# Patient Record
Sex: MALE
[De-identification: names, ages, dates, MRNs, and addresses within clinical notes are randomized per-mention and may not be internally consistent; named-entity substitution may affect disease eponyms.]

---

## 2019-01-10 ENCOUNTER — HOSPITAL ENCOUNTER (OUTPATIENT)
Dept: HOSPITAL 42 - CARDIO | Age: 49
End: 2019-01-10
Payer: COMMERCIAL

## 2019-01-17 ENCOUNTER — HOSPITAL ENCOUNTER (OUTPATIENT)
Dept: HOSPITAL 42 - CATH | Age: 49
LOS: 1 days | Discharge: HOME | End: 2019-01-18
Attending: INTERNAL MEDICINE
Payer: COMMERCIAL

## 2019-01-17 VITALS — BODY MASS INDEX: 30.4 KG/M2

## 2019-01-17 DIAGNOSIS — Z95.1: ICD-10-CM

## 2019-01-17 DIAGNOSIS — E11.9: ICD-10-CM

## 2019-01-17 DIAGNOSIS — I25.2: ICD-10-CM

## 2019-01-17 DIAGNOSIS — I25.10: Primary | ICD-10-CM

## 2019-01-17 DIAGNOSIS — Z95.5: ICD-10-CM

## 2019-01-17 DIAGNOSIS — E78.00: ICD-10-CM

## 2019-01-17 DIAGNOSIS — I10: ICD-10-CM

## 2019-01-17 DIAGNOSIS — E66.3: ICD-10-CM

## 2019-01-17 LAB
APTT BLD: 32.2 SECONDS (ref 25.1–36.5)
BASOPHILS # BLD AUTO: 0.02 K/MM3 (ref 0–2)
BASOPHILS NFR BLD: 0.2 % (ref 0–3)
BUN SERPL-MCNC: 17 MG/DL (ref 7–21)
CALCIUM SERPL-MCNC: 9.9 MG/DL (ref 8.4–10.5)
EOSINOPHIL # BLD: 0.3 10*3/UL (ref 0–0.7)
EOSINOPHIL NFR BLD: 2.5 % (ref 1.5–5)
ERYTHROCYTE [DISTWIDTH] IN BLOOD BY AUTOMATED COUNT: 12.2 % (ref 11.5–14.5)
GFR NON-AFRICAN AMERICAN: > 60
GRANULOCYTES # BLD: 4.48 10*3/UL (ref 1.4–6.5)
GRANULOCYTES NFR BLD: 44.3 % (ref 50–68)
HDLC SERPL-MCNC: 31 MG/DL (ref 29–60)
HGB BLD-MCNC: 16.5 G/DL (ref 14–18)
INR PPP: 0.92
LDLC SERPL-MCNC: 92 MG/DL (ref 0–129)
LYMPHOCYTES # BLD: 4.9 10*3/UL (ref 1.2–3.4)
LYMPHOCYTES NFR BLD AUTO: 48.5 % (ref 22–35)
MCH RBC QN AUTO: 31.5 PG (ref 25–35)
MCHC RBC AUTO-ENTMCNC: 34.1 G/DL (ref 31–37)
MCV RBC AUTO: 92.5 FL (ref 80–105)
MONOCYTES # BLD AUTO: 0.5 10*3/UL (ref 0.1–0.6)
MONOCYTES NFR BLD: 4.5 % (ref 1–6)
PLATELET # BLD: 266 10^3/UL (ref 120–450)
PMV BLD AUTO: 9.1 FL (ref 7–11)
PROTHROMBIN TIME: 10.6 SECONDS (ref 9.4–12.5)
RBC # BLD AUTO: 5.23 10^6/UL (ref 3.5–6.1)
WBC # BLD AUTO: 10.1 10^3/UL (ref 4.5–11)

## 2019-01-17 PROCEDURE — 85025 COMPLETE CBC W/AUTO DIFF WBC: CPT

## 2019-01-17 PROCEDURE — 80048 BASIC METABOLIC PNL TOTAL CA: CPT

## 2019-01-17 PROCEDURE — 80061 LIPID PANEL: CPT

## 2019-01-17 PROCEDURE — 86900 BLOOD TYPING SEROLOGIC ABO: CPT

## 2019-01-17 PROCEDURE — 86850 RBC ANTIBODY SCREEN: CPT

## 2019-01-17 PROCEDURE — 36415 COLL VENOUS BLD VENIPUNCTURE: CPT

## 2019-01-17 PROCEDURE — 93459 L HRT ART/GRFT ANGIO: CPT

## 2019-01-17 PROCEDURE — 85730 THROMBOPLASTIN TIME PARTIAL: CPT

## 2019-01-17 PROCEDURE — 93005 ELECTROCARDIOGRAM TRACING: CPT

## 2019-01-17 PROCEDURE — 85610 PROTHROMBIN TIME: CPT

## 2019-01-17 PROCEDURE — 99152 MOD SED SAME PHYS/QHP 5/>YRS: CPT

## 2019-01-17 PROCEDURE — 99153 MOD SED SAME PHYS/QHP EA: CPT

## 2019-01-17 NOTE — CARDCATH
PROCEDURE DATE:  01/17/2019



HISTORY:  The patient is a 48-year-old male with history of coronary artery

bypass surgery, multiple stents as well as an inferior wall MI in the past

who presents with an abnormal stress test with a decreasing LV function,

because of this, cardiac catheterization was recommended.



PROCEDURE:  Left heart catheterization with coronary arteriography, left

ventriculogram, LIMA angiogram, supra-aortic valvular injection as well as

followed by PTCA and stent of the proximal LAD.



The right femoral artery was cannulated with 6-Swedish sheath.  There were

no complications.



I performed moderate sedation which included the presence of an independent

trained observer that assisted in monitoring the patient's level of

consciousness and physiologic status.  After administration of Versed and

fentanyl, my intra service time was 45 minutes.



The findings on catheterization revealed a left ventricle that revealed a

normal arlen anterior apical segment.  The inferobasal segment was

akinetic consistent with an inferior wall MI.  Overall ejection fraction is

approximately 40%.



Supra-aortic valvular injection revealed no aortic insufficiency.



The patient had a right dominant circulation.  The RCA revealed a patent

stent in the proximal midportion.  The stent was a long stent.  In the

midportion of the stent, there is a 40%-50% stenoses noted.



The left main artery had tubular narrowing throughout with discrete lesion.



The LAD revealed long critical lesion in the proximal portion of 80% with a

second 70%-80% stenoses at the takeoff of a large diagonal vessel.



The diagonal vessel revealed diffuse atherosclerosis with a sub-occluded

superior branch.



The LAD revealed to-and-fro flow from the LIMA.



The circumflex artery revealed diffuse atherosclerosis with a subtotal

occluded small first obtuse marginal branch.



The LIMA was found to be patent _____ antegrade flow to the distal LAD with

retrograde flow to the second diagonal vessel without retrograde flow to

the first diagonal vessel.



The patient was started on intravenous Angiomax on the fluoroscopic guide,

the guiding catheter was placed in the _____ in the left main artery.  An

0.014 ATW wire was used to cross the critical lesion into the diagonal

vessel.  A 2.5 balloon was utilized to predilate the lesion.



A 2.75 x 20 mm drug-eluting stent was placed and deployed extending from

the proximal LAD into the takeoff of the diagonal vessel.



Repeat coronary arteriography revealed an excellent result with no residual

stenosis and SYLVAIN-III flow with preservation of flow to the LAD at the

diagonal vessel.



Angio-Seal was used to close the femoral artery site.  The patient

tolerated the procedure well.



In summary, the procedure was successful, a PTCA and stent of the proximal

LAD and second lesion at the takeoff of a large diagonal vessel.  The first

diagonal vessel which is a large vessel was not protected by the LIMA which

is the rationale placing a stent in the proximal LAD.



Preservation of the flow down the mid and distal LAD was observed with

to-and-fro flow from the LIMA.



Coronary arteriography revealed triple-vessel CAD with a patent stent in

the proximal RCA with 50% stenosis in the mid stent.



The circumflex artery revealed and occluded small first obtuse marginal

branch.



The diagonal vessel was now patent; however, had a superior branch of the

diagonal vessel that was subtotally occluded.



LV function was better than reported on the stress test.  Overall ejection

fraction is approximately 40% with a akinetic inferobasal segment

consistent with his previous inferior wall MI.



Given these findings, the patient will need to be on aspirin indefinitely

and Brilinta for now the year.



He needs to have an aggressive cardiac risk reduction program given his

aggressive atherosclerosis.  We will consider and push for _____ the

patient as well as his insurance can improve his payment.







__________________________________________

Leland Lopez MD





DD:  01/17/2019 10:18:30

DT:  01/17/2019 10:23:46

Job # [id number]

## 2019-01-17 NOTE — CARD
--------------- APPROVED REPORT --------------





Date of service: 01/17/2019



EKG Measurement

Heart [id number]

TN 150P10

[id number]

[id number]

[id number]



<Conclusion>

Normal sinus rhythm

Left axis deviation

Moderate voltage criteria for LVH, may be normal variant

Possible Lateral infarct, age undetermined

Inferior infarct, age undetermined

STTW changes c/w ischemia

## 2019-01-18 VITALS
OXYGEN SATURATION: 100 % | SYSTOLIC BLOOD PRESSURE: 132 MMHG | DIASTOLIC BLOOD PRESSURE: 79 MMHG | HEART RATE: 65 BPM | RESPIRATION RATE: 18 BRPM

## 2019-01-18 VITALS — TEMPERATURE: 98.2 F

## 2019-01-18 LAB
BASOPHILS # BLD AUTO: 0.01 K/MM3 (ref 0–2)
BASOPHILS NFR BLD: 0.1 % (ref 0–3)
BUN SERPL-MCNC: 14 MG/DL (ref 7–21)
CALCIUM SERPL-MCNC: 9.6 MG/DL (ref 8.4–10.5)
EOSINOPHIL # BLD: 0.2 10*3/UL (ref 0–0.7)
EOSINOPHIL NFR BLD: 2 % (ref 1.5–5)
ERYTHROCYTE [DISTWIDTH] IN BLOOD BY AUTOMATED COUNT: 12.3 % (ref 11.5–14.5)
GFR NON-AFRICAN AMERICAN: > 60
GRANULOCYTES # BLD: 4.79 10*3/UL (ref 1.4–6.5)
GRANULOCYTES NFR BLD: 54.4 % (ref 50–68)
HGB BLD-MCNC: 15.5 G/DL (ref 14–18)
LYMPHOCYTES # BLD: 3.3 10*3/UL (ref 1.2–3.4)
LYMPHOCYTES NFR BLD AUTO: 37.5 % (ref 22–35)
MCH RBC QN AUTO: 31.4 PG (ref 25–35)
MCHC RBC AUTO-ENTMCNC: 34.1 G/DL (ref 31–37)
MCV RBC AUTO: 91.9 FL (ref 80–105)
MONOCYTES # BLD AUTO: 0.5 10*3/UL (ref 0.1–0.6)
MONOCYTES NFR BLD: 6 % (ref 1–6)
PLATELET # BLD: 225 10^3/UL (ref 120–450)
PMV BLD AUTO: 9.1 FL (ref 7–11)
RBC # BLD AUTO: 4.94 10^6/UL (ref 3.5–6.1)
WBC # BLD AUTO: 8.8 10^3/UL (ref 4.5–11)

## 2019-01-18 NOTE — CARD
--------------- APPROVED REPORT --------------





Date of service: 01/18/2019



EKG Measurement

Heart [id number]

AR 150P18

[id number]

[id number]

[id number]



<Conclusion>

Sinus rhythm with occasional and consecutive premature ventricular 

complexes

Minimal voltage criteria for LVH, may be normal variant

Inferior infarct, age undetermined

Abnormal ECG

## 2019-01-18 NOTE — CARD
--------------- APPROVED REPORT --------------





Date of service: 01/17/2019



EKG Measurement

Heart [id number]

AR 156P12

[id number]

[id number]

[id number]



<Conclusion>

Normal sinus rhythm

Left axis deviation

Minimal voltage criteria for LVH, may be normal variant

Inferior infarct, age undetermined

Abnormal ECG

## 2019-04-08 ENCOUNTER — HOSPITAL ENCOUNTER (OUTPATIENT)
Dept: HOSPITAL 42 - CATH | Age: 49
LOS: 1 days | Discharge: HOME | End: 2019-04-09
Attending: INTERNAL MEDICINE
Payer: COMMERCIAL

## 2019-04-08 VITALS — RESPIRATION RATE: 18 BRPM

## 2019-04-08 VITALS — BODY MASS INDEX: 32.5 KG/M2

## 2019-04-08 DIAGNOSIS — Z82.49: ICD-10-CM

## 2019-04-08 DIAGNOSIS — I10: ICD-10-CM

## 2019-04-08 DIAGNOSIS — E78.00: ICD-10-CM

## 2019-04-08 DIAGNOSIS — I42.9: ICD-10-CM

## 2019-04-08 DIAGNOSIS — I25.10: Primary | ICD-10-CM

## 2019-04-08 DIAGNOSIS — Z95.1: ICD-10-CM

## 2019-04-08 DIAGNOSIS — I25.2: ICD-10-CM

## 2019-04-08 DIAGNOSIS — Z95.5: ICD-10-CM

## 2019-04-08 LAB
APTT BLD: 32.3 SECONDS (ref 26.9–38.3)
BASOPHILS # BLD AUTO: 0.03 K/MM3 (ref 0–2)
BASOPHILS NFR BLD: 0.3 % (ref 0–3)
BUN SERPL-MCNC: 21 MG/DL (ref 7–21)
CALCIUM SERPL-MCNC: 9.2 MG/DL (ref 8.4–10.5)
EOSINOPHIL # BLD: 0.3 10*3/UL (ref 0–0.7)
EOSINOPHIL NFR BLD: 3.2 % (ref 1.5–5)
ERYTHROCYTE [DISTWIDTH] IN BLOOD BY AUTOMATED COUNT: 12.3 % (ref 11.5–14.5)
GFR NON-AFRICAN AMERICAN: > 60
HDLC SERPL-MCNC: 27 MG/DL (ref 29–60)
HGB BLD-MCNC: 16.1 G/DL (ref 14–18)
INR PPP: 1.05
LDLC SERPL-MCNC: 66 MG/DL (ref 0–129)
LYMPHOCYTES # BLD: 3.5 10*3/UL (ref 1.2–3.4)
LYMPHOCYTES NFR BLD AUTO: 36.4 % (ref 22–35)
MCH RBC QN AUTO: 31.3 PG (ref 25–35)
MCHC RBC AUTO-ENTMCNC: 34.3 G/DL (ref 31–37)
MCV RBC AUTO: 91.3 FL (ref 80–105)
MONOCYTES # BLD AUTO: 0.7 10*3/UL (ref 0.1–0.6)
MONOCYTES NFR BLD: 6.7 % (ref 1–6)
PLATELET # BLD: 205 10^3/UL (ref 120–450)
PMV BLD AUTO: 9.2 FL (ref 7–11)
PROTHROMBIN TIME: 11.9 SECONDS (ref 9.4–12.5)
RBC # BLD AUTO: 5.15 10^6/UL (ref 3.5–6.1)
WBC # BLD AUTO: 9.7 10^3/UL (ref 4.5–11)

## 2019-04-08 PROCEDURE — 80053 COMPREHEN METABOLIC PANEL: CPT

## 2019-04-08 PROCEDURE — 85610 PROTHROMBIN TIME: CPT

## 2019-04-08 PROCEDURE — 36415 COLL VENOUS BLD VENIPUNCTURE: CPT

## 2019-04-08 PROCEDURE — 85025 COMPLETE CBC W/AUTO DIFF WBC: CPT

## 2019-04-08 PROCEDURE — 92920 PRQ TRLUML C ANGIOP 1ART&/BR: CPT

## 2019-04-08 PROCEDURE — 80048 BASIC METABOLIC PNL TOTAL CA: CPT

## 2019-04-08 PROCEDURE — 99152 MOD SED SAME PHYS/QHP 5/>YRS: CPT

## 2019-04-08 PROCEDURE — 82139 AMINO ACIDS QUAN 6 OR MORE: CPT

## 2019-04-08 PROCEDURE — 85730 THROMBOPLASTIN TIME PARTIAL: CPT

## 2019-04-08 PROCEDURE — 85576 BLOOD PLATELET AGGREGATION: CPT

## 2019-04-08 PROCEDURE — 85175 BLOOD CLOT LYSIS TIME: CPT

## 2019-04-08 PROCEDURE — 99153 MOD SED SAME PHYS/QHP EA: CPT

## 2019-04-08 PROCEDURE — 93459 L HRT ART/GRFT ANGIO: CPT

## 2019-04-08 PROCEDURE — 86900 BLOOD TYPING SEROLOGIC ABO: CPT

## 2019-04-08 PROCEDURE — 93005 ELECTROCARDIOGRAM TRACING: CPT

## 2019-04-08 PROCEDURE — 80061 LIPID PANEL: CPT

## 2019-04-08 PROCEDURE — 85027 COMPLETE CBC AUTOMATED: CPT

## 2019-04-08 PROCEDURE — 86850 RBC ANTIBODY SCREEN: CPT

## 2019-04-08 NOTE — HP
DATE OF EXAM:  04/08/2019



HISTORY OF PRESENT ILLNESS:  I saw him status post cardiac cath with Dr. Lopez, where he put a stent and he comes in with a history of having chest

pressure with exertion and pain and swelling in his feet with prolonged

standing.  He is a 49-year-old man who has a past medical history of

myocardial infarction, pneumonia, coronary artery disease, high

cholesterol, cardiac stents in the past, MI in the past, cardiomyopathy,

CABG history, LASIK.  I think this is up to his fourth stent, right now he

has a AICD placement.  There is cardiovascular disease in his family with

his father.  He has  defibrillator AICD.  He has had chest pain and

swelling.  He is lying flat right now on the rBaker.



ALLERGIES:  HE HAS NO KNOWN DRUG ALLERGIES.



MEDICATIONS:  He is currently on Crestor, Ecotrin, Lasix and Zestril at

home.  In the hospital, he is on Ecotrin, Lipitor, Plavix, IV fluids and

Zestril.



REVIEW OF SYSTEMS:  He has no acute vision or hearing changes.  No neck

pain.  No sore throat.  He did have chest pain.  He has shortness of

breath.  No nausea, vomiting, constipation, diarrhea.  His legs were

swollen.  No problems urinating.  No skin issues that he knows of.



PHYSICAL EXAMINATION:

VITAL SIGNS:  He has a 97.8 temperature, 81 pulse, 149/86 blood pressure,

18 respiratory rate, 96% O2 sat on room air.

HEENT:  Head is atraumatic, normocephalic.

HEART:  Regular rate.

LUNGS:  Decreased breath sounds, but clear what I could tell.

ABDOMEN:  Soft, obese, nontender with positive bowel sounds.

EXTREMITIES:  Trace edema, +1.

SKIN:  I could not tell the skin is lying flat on the gurney, he is 

to get up for 6 hours.

NEUROLOGIC:  Alert and oriented x3.  Thyroid midline.  No palpable

appreciable lymphadenopathy at this time.



LABORATORY DATA:  He has a 9.7 white count, 16.1 hemoglobin, 47 hematocrit

with 205 platelets.  A 1.05 of INR.  He has a 141 sodium, potassium 4.9,

BUN 21, creatinine 1.1, GRF is greater than 60, sugar is 118.  Calcium is

9.2, triglycerides 104, cholesterol is 109.  He will be watched overnight,

laid flat for 6 hours.  We will check his labs tomorrow.  He is here

for CAD from chest pain and shortness of breath with stent placement.







__________________________________________

Eligio Segura DO





DD:  04/08/2019 9:17:08

DT:  04/08/2019 9:18:47

Job # [id number]

[id number]

## 2019-04-08 NOTE — CARDCATH
PROCEDURE DATE:  04/08/2019



HISTORY:  The patient is a 49-year-old male who presents with recurrence of

exertional angina.



The patient's past medical history is complicated with history of coronary

bypass surgery and multiple PTCAs in the past done at [facility].  Because of his ongoing symptoms, cardiac catheterization was

recommended.



PROCEDURES:  Left heart catheterization with coronary arteriography, left

ventriculogram, aortic root injection, left internal mammary artery

angiogram, as well as percutaneous transluminal coronary angioplasty of a

small distal left anterior descending through the left internal mammary

artery anastomotic area.



The left femoral artery was cannulated with 6-Romansh sheath.  There were no

complications.



I performed moderate sedation which included the presence of an independent

trained observer that assisted in monitoring the patient's level of

consciousness and physiologic status.  After administration of Versed and

fentanyl, my intra-service time was 45 minutes.



Findings on catheterization revealed a left ventricle that revealed

inferior wall hypokinesis with an anterior and apical segment that moves

well.  Overall ejection fraction is approximately 40%.



Supra-aortic valvular injection revealed no aortic insufficiency.



The RCA was a dominant vessel and revealed diffuse atherosclerosis

throughout its tree including the 50% to 60% stenoses in the posterior

lateral branch.



The left main artery revealed a 50% to 60% stenosis in its proximal

portion.  The LAD revealed a patent stent in its proximal portion as well

as to-and-fro flow in the distal LAD after an 80% stenoses.



There was good flow down the diagonal vessel which was diffusely diseased.



Circumflex artery and obtuse marginal branch revealed diffuse

atherosclerosis.



The LIMA was found to be patent and provided good antegrade flow.  Distal

to the anastomotic site, there is a 90% stenoses in a small apical LAD.



Because of his ongoing symptoms, the patient was started on intravenous

Angiomax.  Under fluoroscopic guide, a LIMA catheter was placed in the

ostium of the LIMA site.



An ATW wire was used to cross the LAD past the anastomotic site.  A 2.0

balloon was utilized to dilate the area up to 5 to 6 atmospheres.  Repeat

coronary arteriography revealed improvement of the lesion up to a 40% to

50% residual stenoses with SYLVAIN III flow.  No stent was placed due to the

size of the vessel.



The patient tolerated the procedure well.  AngioSeal was used to close the

femoral artery site.



In summary, the procedure was successful PTCA of the apical LAD lesions

through the LIMA.  No stent was placed due to the small size of the apical

LAD.



Cardiac catheterization revealed an EF of approximately 40% with an

akinetic inferior wall.



Borderline left main stenoses with a critical lesion in the apical LAD

after the anastomotic site.  A patent LIMA was noted.



Given these findings, the patient will continue on aspirin indefinitely and

Plavix for a month.  The patient needs to undergo a cardiac risk reduction

program.







__________________________________________

Leland Lopez MD





DD:  04/08/2019 9:23:42

DT:  04/08/2019 9:27:17

Job # [id number]

## 2019-04-09 VITALS — OXYGEN SATURATION: 97 % | TEMPERATURE: 97 F

## 2019-04-09 VITALS — HEART RATE: 92 BPM

## 2019-04-09 VITALS — DIASTOLIC BLOOD PRESSURE: 72 MMHG | SYSTOLIC BLOOD PRESSURE: 141 MMHG

## 2019-04-09 LAB
ALBUMIN SERPL-MCNC: 4.1 G/DL (ref 3–4.8)
ALBUMIN/GLOB SERPL: 1.4 {RATIO} (ref 1.1–1.8)
ALT SERPL-CCNC: 43 U/L (ref 7–56)
AST SERPL-CCNC: 26 U/L (ref 17–59)
BASOPHILS # BLD AUTO: 0.02 K/MM3 (ref 0–2)
BASOPHILS NFR BLD: 0.3 % (ref 0–3)
BUN SERPL-MCNC: 14 MG/DL (ref 7–21)
CALCIUM SERPL-MCNC: 9.7 MG/DL (ref 8.4–10.5)
EOSINOPHIL # BLD: 0.3 10*3/UL (ref 0–0.7)
EOSINOPHIL NFR BLD: 3.8 % (ref 1.5–5)
ERYTHROCYTE [DISTWIDTH] IN BLOOD BY AUTOMATED COUNT: 12.6 % (ref 11.5–14.5)
GFR NON-AFRICAN AMERICAN: > 60
HGB BLD-MCNC: 15.2 G/DL (ref 14–18)
LYMPHOCYTES # BLD: 3 10*3/UL (ref 1.2–3.4)
LYMPHOCYTES NFR BLD AUTO: 39.3 % (ref 22–35)
MCH RBC QN AUTO: 31 PG (ref 25–35)
MCHC RBC AUTO-ENTMCNC: 33.6 G/DL (ref 31–37)
MCV RBC AUTO: 92.3 FL (ref 80–105)
MONOCYTES # BLD AUTO: 0.4 10*3/UL (ref 0.1–0.6)
MONOCYTES NFR BLD: 5.8 % (ref 1–6)
PLATELET # BLD: 197 10^3/UL (ref 120–450)
PMV BLD AUTO: 9.5 FL (ref 7–11)
RBC # BLD AUTO: 4.91 10^6/UL (ref 3.5–6.1)
WBC # BLD AUTO: 7.7 10^3/UL (ref 4.5–11)

## 2019-04-09 NOTE — DS
HISTORY OF PRESENT ILLNESS:  He came in, had a cardiac cath with stents

placed.  He is being discharged to outpatient followup.  He will follow up

with his primary care physician and also with Dr. Lopez, cardiologist.  He

is resting comfortably in bed.  He slept well.  He is eating well.  No

chest pain.  No shortness of breath.  No abdominal pain.  No leg pain, also

he walked to the bathroom.



PHYSICAL EXAMINATION:

VITAL SIGNS:  He has 97 temperature, 78 pulse, 114/79 blood pressure, 18

respiratory rate, and 97% O2 sat on room air.

GENERAL:  He is alert, smiling, and talking.

HEENT:  Head is atraumatic and normocephalic.

HEART:  Regular rate.

LUNGS:  Clear to auscultation.

ABDOMEN:  Soft, obese, and nontender.

EXTREMITIES:  No edema.



We discussed weight loss that will help him with his coronaries and his

activity, he is to do more walking.



LABORATORY DATA:  His white count is 7.7, hemoglobin of 15.2, hematocrit

45.3, and platelets of 197.  He has a 141 sodium, potassium 4.9, BUN 21,

creatinine 1.1, GFR is greater than 60, sugar is 118, calcium is 9.2, and

cholesterol is 109.



MEDICATIONS:  He is currently on aspirin, Lipitor, Plavix, and Zestril,

though stay on his medications.



PLAN:  He will be discharged after Dr. Lopez sees him this morning.







__________________________________________

Eligio Segura DO





DD:  04/09/2019 7:52:06

DT:  04/09/2019 10:42:24

Job # [id number]

## 2019-04-09 NOTE — CARD
--------------- APPROVED REPORT --------------





Date of service: 04/08/2019



EKG Measurement

Heart [id number]

ND 164P14

[id number]

[id number]

[id number]



<Conclusion>

Normal sinus rhythm

Left axis deviation

Moderate voltage criteria for LVH, may be normal variant

Inferior infarct, age undetermined

Abnormal ECG

## 2019-04-09 NOTE — PN
DATE:  04/09/2019



CARDIOLOGY FOLLOWUP



SUBJECTIVE:  The patient is asymptomatic post PTCA.



PHYSICAL EXAMINATION:

VITAL SIGNS:  Blood pressure 114/80, heart rate in the 90s.

NECK:  Negative JVD.

LUNGS:  Without rales.

HEART:  S1, S2.

EXTREMITIES:  Without edema.  The groin site is stable.



LABORATORY DATA:  Hemoglobin is 15.  Chemistries unremarkable.



ASSESSMENT AND PLAN:

1.  Stable post percutaneous transluminal coronary angioplasty of the left

anterior descending.

2.  History of coronary artery bypass surgery.

3.  Hypertension.

4.  Hypercholesterolemia.



Given these findings, the patient can be discharged today.  He will need to

remain on Plavix for approximately two weeks.  Followup and instructions

given to the patient in detail.







__________________________________________

Leland Lopez MD





DD:  04/09/2019 9:20:49

DT:  04/09/2019 9:23:57

Job # [id number]

## 2019-04-09 NOTE — CARD
--------------- APPROVED REPORT --------------





Date of service: 04/09/2019



EKG Measurement

Heart [id number]

ID 152P21

[id number]

[id number]

[id number]



<Conclusion>

Normal sinus rhythm

Left axis deviation

Moderate voltage criteria for LVH, may be normal variant

Inferior infarct, age undetermined

Anterolateral infarct, age undetermined

Abnormal ECG